# Patient Record
Sex: FEMALE | Race: WHITE
[De-identification: names, ages, dates, MRNs, and addresses within clinical notes are randomized per-mention and may not be internally consistent; named-entity substitution may affect disease eponyms.]

---

## 2017-01-27 ENCOUNTER — HOSPITAL ENCOUNTER (EMERGENCY)
Dept: HOSPITAL 25 - ED | Age: 62
Discharge: HOME | End: 2017-01-27
Payer: COMMERCIAL

## 2017-01-27 VITALS — SYSTOLIC BLOOD PRESSURE: 127 MMHG | DIASTOLIC BLOOD PRESSURE: 71 MMHG

## 2017-01-27 DIAGNOSIS — R07.9: Primary | ICD-10-CM

## 2017-01-27 DIAGNOSIS — R00.2: ICD-10-CM

## 2017-01-27 DIAGNOSIS — F41.9: ICD-10-CM

## 2017-01-27 DIAGNOSIS — E66.9: ICD-10-CM

## 2017-01-27 LAB
ALBUMIN SERPL BCG-MCNC: 4.2 G/DL (ref 3.2–5.2)
ALP SERPL-CCNC: 46 U/L (ref 34–104)
ALT SERPL W P-5'-P-CCNC: 27 U/L (ref 7–52)
ANION GAP SERPL CALC-SCNC: 9 MMOL/L (ref 2–11)
AST SERPL-CCNC: 19 U/L (ref 13–39)
BUN SERPL-MCNC: 18 MG/DL (ref 6–24)
BUN/CREAT SERPL: 23.4 (ref 8–20)
CALCIUM SERPL-MCNC: 9.3 MG/DL (ref 8.6–10.3)
CHLORIDE SERPL-SCNC: 106 MMOL/L (ref 101–111)
GLOBULIN SER CALC-MCNC: 2.7 G/DL (ref 2–4)
GLUCOSE SERPL-MCNC: 100 MG/DL (ref 70–100)
HCO3 SERPL-SCNC: 23 MMOL/L (ref 22–32)
HCT VFR BLD AUTO: 44 % (ref 35–47)
HGB BLD-MCNC: 14.5 G/DL (ref 12–16)
MCH RBC QN AUTO: 31 PG (ref 27–31)
MCHC RBC AUTO-ENTMCNC: 33 G/DL (ref 31–36)
MCV RBC AUTO: 93 FL (ref 80–97)
POTASSIUM SERPL-SCNC: 3.7 MMOL/L (ref 3.5–5)
PROT SERPL-MCNC: 6.9 G/DL (ref 6.4–8.9)
RBC # BLD AUTO: 4.71 10^6/UL (ref 4–5.4)
SODIUM SERPL-SCNC: 138 MMOL/L (ref 133–145)
TROPONIN I SERPL-MCNC: 0 NG/ML (ref ?–0.04)
TSH SERPL-ACNC: 1.12 MCIU/ML (ref 0.34–5.6)
WBC # BLD AUTO: 10.3 10^3/UL (ref 3.5–10.8)

## 2017-01-27 PROCEDURE — 83880 ASSAY OF NATRIURETIC PEPTIDE: CPT

## 2017-01-27 PROCEDURE — 84443 ASSAY THYROID STIM HORMONE: CPT

## 2017-01-27 PROCEDURE — 85379 FIBRIN DEGRADATION QUANT: CPT

## 2017-01-27 PROCEDURE — 36415 COLL VENOUS BLD VENIPUNCTURE: CPT

## 2017-01-27 PROCEDURE — 71020: CPT

## 2017-01-27 PROCEDURE — 93005 ELECTROCARDIOGRAM TRACING: CPT

## 2017-01-27 PROCEDURE — 85025 COMPLETE CBC W/AUTO DIFF WBC: CPT

## 2017-01-27 PROCEDURE — 99283 EMERGENCY DEPT VISIT LOW MDM: CPT

## 2017-01-27 PROCEDURE — 84484 ASSAY OF TROPONIN QUANT: CPT

## 2017-01-27 PROCEDURE — 80053 COMPREHEN METABOLIC PANEL: CPT

## 2017-01-27 NOTE — RAD
INDICATION:  Chest pain.



COMPARISON:  Comparison is made with a prior chest x-ray study from 1/23/2004.



TECHNIQUE: Dual-energy PA  and lateral views of the chest were obtained.



FINDINGS:   The heart is within normal limits in size. Mediastinal and hilar contours

appear within normal limits.



The lungs are hyperinflated and clear with flattening of the diaphragms. No pleural

effusion is seen. 



IMPRESSION:  FINDINGS CONSISTENT WITH COPD, NO EVIDENCE FOR ACUTE FINDING.

## 2017-01-27 NOTE — ED
Lili CHEN SooYoung, scribed for Ady Christensen MD on 17 at 1044 .





Palpitations / Dysrhythmia





- HPI Summary


HPI Summary: 





A 62 y/o F presents to ED with c/o daily, intermittent racing heart rate onset 

since the election, 2016. Non-radiating pain. Denies calf pain, SOB, 

fever, chills, weight fluctuation. No recent travel, surgeries or 

hospitalizations. Sx not aggravated by food or sleep. She called Dr. García, 

and he referred her to ED for testing. Not on water pills. She's never 

previously had a stress test. Pt is a smoker. Coffee and tea daily.











- History of Current Complaint


Chief Complaint: EDDysrhythmPalp


Hx Obtained From: Patient


Onset/Duration: Lasting Weeks, Still Present


Timing: Intermittent Episodes Lasting:


Character: Fast





- Allergy/Home Medications


Allergies/Adverse Reactions: 


 Allergies











Allergy/AdvReac Type Severity Reaction Status Date / Time


 


No Known Allergies Allergy   Verified 17 10:31











Home Medications: 


 Home Medications





Ascorbic Acid TAB* [Vitamin C  TAB*] 500 - 1,000 mg PO DAILY 17 [History 

Confirmed 17]


Aspirin Low Dose CHEW TAB* [Aspirin Low Dose TAB*] 81 mg PO DAILY 17 [

History Confirmed 17]











PMH/Surg Hx/FS Hx/Imm Hx


Previously Healthy: Yes


Endocrine/Hematology History: 


   Denies: Hx Anticoagulant Therapy, Hx Diabetes, Hx Thyroid Disease


Cardiovascular History: 


   Denies: Hx Hypertension, Hx Pacemaker/ICD


Respiratory History: 


   Denies: Hx Asthma, Hx Chronic Obstructive Pulmonary Disease (COPD)


 History: 


   Denies: Hx Renal Disease


Musculoskeletal History: Reports: Hx Arthritis - RIGHT KNEE ,WRIST AND ANKLE


Sensory History: Reports: Hx Contacts or Glasses


   Denies: Hx Hearing Aid


Opthamlomology History: Reports: Hx Contacts or Glasses


Neurological History: 


   Denies: Hx Dementia, Hx Seizures


Psychiatric History: 


   Denies: Hx Substance Abuse





- Cancer History


Hx Chemotherapy: No


Hx Radiation Therapy: No





- Surgical History


Surgery Procedure, Year, and Place: C SECTION  [facility].  RIGHT 

ANKLE SURGERY [facility]


Hx Anesthesia Reactions: No


Infectious Disease History: No


Infectious Disease History: 


   Denies: Hx Hepatitis, Hx Human Immunodeficiency Virus (HIV), Traveled 

Outside the [place] in Last 30 Days





- Family History


Known Family History: Positive: Cardiac Disease - father  at 53, sister 

heart-related probs, Other - BREAST CA





- Social History


Occupation: Unemployed - HOMEMAKER


Lives: With Family - [employer]


Hx Substance Use: Yes


Substance Use Type: Reports: Marijuana





Review of Systems


Positive: Other - neg: weight fluctuations.  Negative: Fever, Chills


Positive: Palpitations


Negative: Shortness Of Breath


Positive: Other - neg: calf pain.


All Other Systems Reviewed And Are Negative: Yes





Physical Exam





- Summary


Physical Exam Summary: 








The patient is in no acute distress and in no acute pain.   OBESE.





The skin is warm and dry and skin color reflects adequate perfusion.





HEENT:  The head is normocephalic and atraumatic. The pupils are equal and 

reactive.   L EYE CONJUNCTIVAL HEMORRHAGE.   Nares are patent and without 

drainage.  Mouth reveals moist mucous membranes and the throat is without 

erythema and exudate.  The external ears are intact. The ear canals are patent 

and without drainage. The tympanic membranes are intact.





Neck is supple with full range of motion and non-tender. There are no carotid 

bruits.  There is no neck vein distension.  THYROID NML.





Respiratory: Chest is non-tender.  OCCASIONAL WHEEZING AND RHONCHI.





Cardiovascular: Hear is regular rate and rhythm.  There is no murmur or rub 

auscultated.   There is no peripheral edema and pulses are symmetrical and 

equal.





Abdomen: The abdomen is soft and non-tender.  There are normal bowel sounds 

heard in all four quadrants and there is no organomegaly palpated.





Musculoskeletal: There is no back pain noted.  Extremities are non-tender with 

full range of motion.  There is good capillary refill.  There is no peripheral 

edema or calf tenderness elicited.





Neurological: Patient is alert and oriented to person, place and time.  The 

patient has symmetrical motor strength in all four extremities.  Cranial nerves 

are grossly intact. Deep tendon reflexes are symmetrical and equal in all four 

extremities.





Psychiatric: The patient has an appropriate affect and does not exhibit 

depression.  MILD ANXIETY. 


Triage Information Reviewed: Yes


Vital Signs On Initial Exam: 


 Initial Vitals











Temp Pulse Resp BP Pulse Ox


 


 97.3 F   80   17   138/67   96 


 


 17 10:12  17 10:12  17 10:12  17 10:12  17 10:12











Vital Signs Reviewed: Yes





Diagnostics





- Vital Signs


 Vital Signs











  Temp Pulse Resp BP Pulse Ox


 


 17 10:12  97.3 F  80  17  138/67  96














- Laboratory


Lab Results: 


 Lab Results











  17 Range/Units





  11:12 11:12 11:12 


 


WBC  10.3    (3.5-10.8)  10^3/ul


 


RBC  4.71    (4.0-5.4)  10^6/ul


 


Hgb  14.5    (12.0-16.0)  g/dl


 


Hct  44    (35-47)  %


 


MCV  93    (80-97)  fL


 


MCH  31    (27-31)  pg


 


MCHC  33    (31-36)  g/dl


 


RDW  14    (10.5-15)  %


 


Plt Count  295    (150-450)  10^3/ul


 


MPV  9    (7.4-10.4)  um3


 


Neut % (Auto)  62.2    (38-83)  %


 


Lymph % (Auto)  30.3    (25-47)  %


 


Mono % (Auto)  6.4    (1-9)  %


 


Eos % (Auto)  0.7    (0-6)  %


 


Baso % (Auto)  0.4    (0-2)  %


 


Absolute Neuts (auto)  6.4    (1.5-7.7)  10^3/ul


 


Absolute Lymphs (auto)  3.1    (1.0-4.8)  10^3/ul


 


Absolute Monos (auto)  0.7    (0-0.8)  10^3/ul


 


Absolute Eos (auto)  0.1    (0-0.6)  10^3/ul


 


Absolute Basos (auto)  0    (0-0.2)  10^3/ul


 


Absolute Nucleated RBC  0.01    10^3/ul


 


Nucleated RBC %  0.1    


 


D-Dimer, Quantitative    204  (Less Than 230)  ng/mL


 


Sodium   138   (133-145)  mmol/L


 


Potassium   3.7   (3.5-5.0)  mmol/L


 


Chloride   106   (101-111)  mmol/L


 


Carbon Dioxide   23   (22-32)  mmol/L


 


Anion Gap   9   (2-11)  mmol/L


 


BUN   18   (6-24)  mg/dL


 


Creatinine   0.77   (0.51-0.95)  mg/dL


 


Est GFR ( Amer)   98.7   (>60)  


 


Est GFR (Non-Af Amer)   76.7   (>60)  


 


BUN/Creatinine Ratio   23.4 H   (8-20)  


 


Glucose   100   ()  mg/dL


 


Calcium   9.3   (8.6-10.3)  mg/dL


 


Total Bilirubin   0.40   (0.2-1.0)  mg/dL


 


AST   19   (13-39)  U/L


 


ALT   27   (7-52)  U/L


 


Alkaline Phosphatase   46   ()  U/L


 


Troponin I   0.00   (<0.04)  ng/mL


 


B-Natriuretic Peptide    ( - 100) pg/mL


 


Total Protein   6.9   (6.4-8.9)  g/dL


 


Albumin   4.2   (3.2-5.2)  g/dL


 


Globulin   2.7   (2-4)  g/dL


 


Albumin/Globulin Ratio   1.6   (1-3)  


 


TSH   1.12   (0.34-5.60)  mcIU/mL














  17 Range/Units





  11:12 


 


WBC   (3.5-10.8)  10^3/ul


 


RBC   (4.0-5.4)  10^6/ul


 


Hgb   (12.0-16.0)  g/dl


 


Hct   (35-47)  %


 


MCV   (80-97)  fL


 


MCH   (27-31)  pg


 


MCHC   (31-36)  g/dl


 


RDW   (10.5-15)  %


 


Plt Count   (150-450)  10^3/ul


 


MPV   (7.4-10.4)  um3


 


Neut % (Auto)   (38-83)  %


 


Lymph % (Auto)   (25-47)  %


 


Mono % (Auto)   (1-9)  %


 


Eos % (Auto)   (0-6)  %


 


Baso % (Auto)   (0-2)  %


 


Absolute Neuts (auto)   (1.5-7.7)  10^3/ul


 


Absolute Lymphs (auto)   (1.0-4.8)  10^3/ul


 


Absolute Monos (auto)   (0-0.8)  10^3/ul


 


Absolute Eos (auto)   (0-0.6)  10^3/ul


 


Absolute Basos (auto)   (0-0.2)  10^3/ul


 


Absolute Nucleated RBC   10^3/ul


 


Nucleated RBC %   


 


D-Dimer, Quantitative   (Less Than 230)  ng/mL


 


Sodium   (133-145)  mmol/L


 


Potassium   (3.5-5.0)  mmol/L


 


Chloride   (101-111)  mmol/L


 


Carbon Dioxide   (22-32)  mmol/L


 


Anion Gap   (2-11)  mmol/L


 


BUN   (6-24)  mg/dL


 


Creatinine   (0.51-0.95)  mg/dL


 


Est GFR ( Amer)   (>60)  


 


Est GFR (Non-Af Amer)   (>60)  


 


BUN/Creatinine Ratio   (8-20)  


 


Glucose   ()  mg/dL


 


Calcium   (8.6-10.3)  mg/dL


 


Total Bilirubin   (0.2-1.0)  mg/dL


 


AST   (13-39)  U/L


 


ALT   (7-52)  U/L


 


Alkaline Phosphatase   ()  U/L


 


Troponin I   (<0.04)  ng/mL


 


B-Natriuretic Peptide  71 ( - 100) pg/mL


 


Total Protein   (6.4-8.9)  g/dL


 


Albumin   (3.2-5.2)  g/dL


 


Globulin   (2-4)  g/dL


 


Albumin/Globulin Ratio   (1-3)  


 


TSH   (0.34-5.60)  mcIU/mL











Result Diagrams: 


 17 11:12





 17 11:12


Lab Statement: Any lab studies that have been ordered have been reviewed, and 

results considered in the medical decision making process.





- Radiology


  ** CXR


Xray Interpretation: No Acute Changes - IMPRESSION: Findings consistent with 

COPD, no evidence for acute finding.


Radiology Interpretation Completed By: Radiologist





- EKG


  ** 1


Cardiac Rate: NL


EKG Rhythm: Sinus Rhythm


ST Segment: Normal


Ectopy: None





Re-Evaluation





- Re-Evaluation


  ** 1


Re-Evaluation Time: 13:11


Change: Improved


Comment: Discussing results with pt.





Course/Dx





- Course


Course Of Treatment: MDM: Pt is a 62 y/o F who has c/o intermittent, daily 

heart racing since 2016. Denies calf pain, SOB. Pt is a smoker. Pt is 

obese, mildly anxious. PE shows nml thyroid, no pedal edema.  .  Pt given ASA. 

EGK is nml. Trop is 0.00. CXR shows no acute finding, evidence of COPD. Will d/

c pt home with Ativan and to f/u with Dr. García for stress test, echocardiogram

, Holter monitor. Pt voiced understanding.





- Diagnoses


Differential Diagnosis/HQI/PQRI: Positive: Chronic Obstructive Pulmonary Disease

, Congestive Heart Failure, Hypokalemia, Panic Disorder, Pericarditis, 

Pulmonary Embolism, Other - myocardial infarct


Provider Diagnoses: 


 Chest pain, Anxiety








- Physician Notifications


Discussed Care Of Patient With: 1102: Spoke to Dr. García





Discharge





- Discharge Plan


Condition: Stable


Disposition: HOME


Prescriptions: 


LORazepam TAB(*) [Ativan TAB(*)] 1 mg PO TID PRN #20 tab MDD 3


 PRN Reason: anxiety


Patient Education Materials:  Lorazepam (By mouth), Chest Pain (ED), Anxiety (ED

)


Referrals: 


Erwin García MD [Primary Care Provider] - 1 Week


Additional Instructions: 


Follow up with Dr. García next week to schedule outpatient stress test, 

echocardiogram and Holter monitor. 





Continue taking aspirin daily. Take the Ativan as prescribed.





Return to ED if you experience worsening or new symptoms.








The documentation as recorded by the Lili munoz SooYoung accurately 

reflects the service I personally performed and the decisions made by me, Ady Christensen MD.

## 2019-12-16 ENCOUNTER — HOSPITAL ENCOUNTER (EMERGENCY)
Dept: HOSPITAL 25 - UCEAST | Age: 64
Discharge: HOME | End: 2019-12-16
Payer: COMMERCIAL

## 2019-12-16 VITALS — SYSTOLIC BLOOD PRESSURE: 140 MMHG | DIASTOLIC BLOOD PRESSURE: 74 MMHG

## 2019-12-16 DIAGNOSIS — F17.290: ICD-10-CM

## 2019-12-16 DIAGNOSIS — J20.9: Primary | ICD-10-CM

## 2019-12-16 DIAGNOSIS — J98.8: ICD-10-CM

## 2019-12-16 PROCEDURE — 99212 OFFICE O/P EST SF 10 MIN: CPT

## 2019-12-16 PROCEDURE — G0463 HOSPITAL OUTPT CLINIC VISIT: HCPCS

## 2019-12-16 NOTE — UC
Respiratory Complaint HPI





- HPI Summary


HPI Summary: 


64-year-old female presents with 4-5 day history of chest congestion, chest 

tightness, wheezing, shortness of breath, and occasional non-productive cough. 

Associated with some nasal congestion. No documented fever but states was 

chilled yesterday and woke up in a sweat last night. Denies sore throat, ear 

pain, chest pain, palpitations, abdominal pain, nausea, or vomiting.








- History of Current Complaint


Chief Complaint: UCRespiratory


Stated Complaint: CHEST CONGESTION


Time Seen by Provider: 19 13:08


Hx Obtained From: Patient


Pain Intensity: 2





- Allergies/Home Medications


Allergies/Adverse Reactions: 


 Allergies











Allergy/AdvReac Type Severity Reaction Status Date / Time


 


No Known Allergies Allergy   Verified 19 13:09














PMH/Surg Hx/FS Hx/Imm Hx


Previously Healthy: Yes


Other History Of: 


   Negative For: Anticoagulant Therapy





- Surgical History


Surgical History: Yes


Surgery Procedure, Year, and Place: C SECTION  [facility].  RIGHT 

ANKLE SURGERY [facility]





- Family History


Known Family History: Positive: Cardiac Disease - father  at 53, sister 

heart-related probs, Other - BREAST CA





- Social History


Occupation: Works From/At Home


Lives: Alone


Alcohol Use: Occasionally


Substance Use Type: Marijuana


Smoking Status (MU): Light Every Day Tobacco Smoker





Review of Systems


All Other Systems Reviewed And Are Negative: Yes


Constitutional: Positive: Fever - Subjective, Chills


Skin: Negative: Rash


Eyes: Negative: Drainage, Eye Redness


ENT: Positive: Sinus Congestion.  Negative: Sore Throat, Ear Ache, Nasal 

Discharge, Sinus Pain/Tenderness


Respiratory: Positive: Shortness Of Breath, Cough


Cardiovascular: Negative: Palpitations, Chest Pain


Gastrointestinal: Negative: Abdominal Pain, Vomiting, Nausea


Genitourinary: Positive: Negative


Musculoskeletal: Positive: Negative


Neurological: Positive: Negative


Is Patient Immunocompromised?: No





Physical Exam





- Summary


Physical Exam Summary: 


GENERAL APPEARANCE: Alert and cooperative obese female who appears to be in no 

acute distress.





EYES: Conjunctiva clear. No drainage.





EARS: External auditory canals and tympanic membranes clear, hearing grossly 

intact.





NOSE: Mild nasal congestion. No nasal discharge.





THROAT: Pharynx normal. No tonsilar inflammation, swelling, exudate, or 

lesions. Uvula midline.





NECK: Neck supple, non-tender without lymphadenopathy.





CARDIAC: Normal S1 and S2. No S3, S4 or murmurs. Rhythm is regular. There is no 

peripheral edema, cyanosis or pallor. Extremities are warm and well perfused. 

Capillary refill is less than 2 seconds. Peripheral pulses intact.





LUNGS:Mildly decreased bilateral breath sounds with scattered wheezes.





ABDOMEN: Positive bowel sounds. Soft, nondistended, nontender. No guarding or 

rebound. No masses or hepatosplenomegally.





MUSKULOSKELETAL: ROM intact to all extremities. No joint erythema or 

tenderness. Normal muscular development. Normal gait.





SKIN: Skin normal color, texture and turgor with no lesions or eruptions.





Triage Information Reviewed: Yes


Vital Signs: 


 Initial Vital Signs











Temp  98.5 F   19 13:06


 


Pulse  89   19 13:06


 


Resp  20   19 13:06


 


BP  140/74   19 13:06


 


Pulse Ox  98   19 13:06











Vital Signs Reviewed: Yes





Re-Evaluation





- Re-Evaluation


  ** First Eval


Re-Evaluation Time: 14:00


Change: Improved


Comment: Patient reports breathing and chest tightness somewhat improved. 

Bilateral breath sounds clear with improved air exchange.





Respiratory Course/Dx





- Course


Course Of Treatment: 


64-year-old female presents with 4-5 day history of chest congestion, chest 

tightness, wheezing, shortness of breath, and occasional non-productive cough. 

Associated with some nasal congestion. No documented fever but states was 

chilled yesterday and woke up in a sweat last night. Denies sore throat, ear 

pain, chest pain, palpitations, abdominal pain, nausea, or vomiting.  Afebrile.

  Hypertensive otherwise vital signs stable.  Patient had mild nasal congestion

, mildly decreased bilateral breath sounds with scattered wheezes, otherwise 

unremarkable exam.  She was given an albuterol nebulizer treatment in the 

clinic.  Reported some improvement in breathing and chest tightness.  Her 

breath sounds were clear with improved air exchange.  With her smoking history 

we will treat her for an acute bronchitis with reactive airway disease and 

start her on a course of azithromycin as well as provide her with an albuterol 

inhaler.  She is to follow-up with her primary care provider in 3 days if 

symptoms are not improving.  Anticipatory guidance and warning symptoms were 

reviewed with the patient.  Verbalizes understanding and agrees with plan of 

care.








- Differential Dx/Diagnosis


Differential Diagnosis/HQI/PQRI: Bronchitis, Lower Resp Infection, Sinusitis, 

Other - URI


Provider Diagnosis: 


 Acute bronchitis, Reactive airway disease that is not asthma








Discharge ED





- Sign-Out/Discharge


Documenting (check all that apply): Patient Departure


All imaging exams completed and their final reports reviewed: No Studies





- Discharge Plan


Condition: Stable


Disposition: HOME


Patient Education Materials:  Acute Bronchitis (ED), Wheezing (ED)


Referrals: 


Erwin García MD [Primary Care Provider] - 3 Days (If no improvement in 

symptoms.)


Additional Instructions: 


Your history and exam are consistent with acute bronchitis with reactive airway 

disease. 





Start azithromycin 2 tabs today then 1 tab a day for next 4 days.





Use albuterol inhaler 2 puffs every 4-6 hours as needed for shortness of breath 

or wheezing.





Get plenty of rest.





Drink plenty of fluids.





Run a cool mist humidifer in your room at night.





Take over the counter acetaminophen (Tylenol) or ibuprofen (Advil, Motrin) 

according to directions as needed for pain or fever.





Follow up with your primary care provider in 3 days if symptoms do not improve.





Seek immediate medical attention in the emergency room if you have fever 

greater than 100.5 F despite taking acetaminophen or ibuprofen, have chest pain

, difficulty breathing, or have any worsening of symptoms.





- Billing Disposition and Condition


Condition: STABLE


Disposition: Home